# Patient Record
Sex: MALE | Race: WHITE
[De-identification: names, ages, dates, MRNs, and addresses within clinical notes are randomized per-mention and may not be internally consistent; named-entity substitution may affect disease eponyms.]

---

## 2020-03-19 ENCOUNTER — HOSPITAL ENCOUNTER (EMERGENCY)
Dept: HOSPITAL 63 - ER | Age: 20
Discharge: HOME | End: 2020-03-19
Payer: COMMERCIAL

## 2020-03-19 VITALS — HEIGHT: 69 IN | WEIGHT: 171.74 LBS | BODY MASS INDEX: 25.44 KG/M2

## 2020-03-19 VITALS — SYSTOLIC BLOOD PRESSURE: 126 MMHG | DIASTOLIC BLOOD PRESSURE: 85 MMHG

## 2020-03-19 DIAGNOSIS — R05: Primary | ICD-10-CM

## 2020-03-19 LAB
INFLUENZA A PATIENT: NEGATIVE
INFLUENZA B PATIENT: NEGATIVE

## 2020-03-19 PROCEDURE — 87880 STREP A ASSAY W/OPTIC: CPT

## 2020-03-19 PROCEDURE — 36415 COLL VENOUS BLD VENIPUNCTURE: CPT

## 2020-03-19 PROCEDURE — 87070 CULTURE OTHR SPECIMN AEROBIC: CPT

## 2020-03-19 PROCEDURE — 99283 EMERGENCY DEPT VISIT LOW MDM: CPT

## 2020-03-19 PROCEDURE — 87633 RESP VIRUS 12-25 TARGETS: CPT

## 2020-03-19 PROCEDURE — 87804 INFLUENZA ASSAY W/OPTIC: CPT

## 2020-03-19 NOTE — PHYS DOC
Past History


Past Medical History:  No Pertinent History


Past Surgical History:  No Surgical History


Smoking:  Non-smoker


Alcohol Use:  Occasionally


Drug Use:  None





Adult General


Chief Complaint


Chief Complaint:  COUGH





HPI


HPI





Patient is a 19-year-old male who presents with report that he is concerned that

he may have been exposed to the novel coronavirus. Patient states that his 

father was exposed to an employee who was just recently diagnosed with 

coronavirus. Patient states that he has been exercising in their home gym with 

his father and is concerned that he may have been exposed by his father. Patient

does admit to a mild cough over the last couple of days. He denies any fever, 

chest pain or shortness of breath.[]





Review of Systems


Review of Systems





Constitutional: Denies fever or chills []


Respiratory: Complains of cough without shortness of breath []


Cardiovascular: No additional information not addressed in HPI []


GI: Denies abdominal pain, nausea, vomiting, bloody stools or diarrhea []


Integument: Denies rash or skin lesions []


Neurologic: Denies headache, focal weakness or sensory changes []





Allergies


Allergies





Allergies








Coded Allergies Type Severity Reaction Last Updated Verified


 


  No Known Drug Allergies    9/7/18 No











Physical Exam


Physical Exam





Constitutional: Well developed, well nourished, no acute distress, non-toxic 

appearance. []


HENT: Normocephalic, atraumatic, bilateral external ears normal, oropharynx 

moist, no oral exudates, nose normal. []


Cardiovascular:Heart rate regular rhythm, no murmur []


Lungs & Thorax:  Bilateral breath sounds clear to auscultation []


Skin: Warm, dry, no erythema, no rash. []





Current Patient Data


Vital Signs





                                   Vital Signs








  Date Time  Temp Pulse Resp B/P (MAP) Pulse Ox O2 Delivery O2 Flow Rate FiO2


 


3/19/20 13:00 98.2 115 18 126/85 (99) 96 Room Air  











EKG


EKG


[]





Radiology/Procedures


Radiology/Procedures


[]





Course & Med Decision Making


Course & Med Decision Making


Pertinent Labs and Imaging studies reviewed. (See chart for details)





[]





Dragon Disclaimer


Dragon Disclaimer


This electronic medical record was generated, in whole or in part, using a voice

 recognition dictation system.





Departure


Departure:


Impression:  


   Primary Impression:  


   Cough


Disposition:  01 HOME, SELF-CARE


Condition:  STABLE


Referrals:  


PCP,EZIO (PCP)


Patient Instructions:  Cough, Adult





Additional Instructions:  


Follow-up with your primary care provider in the next week.











MARI MEZA Jr. DO          Mar 19, 2020 13:19

## 2020-05-02 ENCOUNTER — HOSPITAL ENCOUNTER (EMERGENCY)
Dept: HOSPITAL 63 - ER | Age: 20
Discharge: HOME | End: 2020-05-02
Payer: COMMERCIAL

## 2020-05-02 VITALS — SYSTOLIC BLOOD PRESSURE: 127 MMHG | DIASTOLIC BLOOD PRESSURE: 65 MMHG

## 2020-05-02 VITALS — BODY MASS INDEX: 25.76 KG/M2 | WEIGHT: 173.94 LBS | HEIGHT: 69 IN

## 2020-05-02 DIAGNOSIS — K02.9: Primary | ICD-10-CM

## 2020-05-02 PROCEDURE — 99283 EMERGENCY DEPT VISIT LOW MDM: CPT

## 2020-05-02 PROCEDURE — 96372 THER/PROPH/DIAG INJ SC/IM: CPT

## 2020-05-02 NOTE — PHYS DOC
Past History


Past Medical History:  Other


Additional Past Medical Histor:  ADD


Past Surgical History:  Other


Additional Past Surgical Histo:  CYST REMOVAL


Smoking:  Non-smoker


Alcohol Use:  Occasionally


Drug Use:  None





General Adult


EDM:


Chief Complaint:  DENTAL PROBLEM





HPI:


HPI:





Patient is a 19-year-old man who presented to ER today for evaluation of right 

lower tooth pain started 2 days ago.  Patient denies any fever, no headache.  

Patient had no problem open or close his mouth.





Review of Systems:


Review of Systems:


Constitutional:  Denies fever or chills 


Eyes:  Denies change in visual acuity 


HENT:  Denies nasal congestion or sore throat . Positive for dental pain


Respiratory:  Denies cough or shortness of breath 


Cardiovascular:  Denies chest pain or edema 


GI:  Denies abdominal pain, nausea, vomiting, bloody stools or diarrhea 


:  Denies dysuria 


Musculoskeletal:  Denies back pain or joint pain 


Integument:  Denies rash 


Neurologic:  Denies headache, focal weakness or sensory changes 


Endocrine:  Denies polyuria or polydipsia 


Lymphatic:  Denies swollen glands 


Psychiatric:  Denies depression or anxiety





Heart Score:


Risk Factors:


Risk Factors:  DM, Current or recent (<one month) smoker, HTN, HLP, family 

history of CAD, obesity.


Risk Scores:


Score 0 - 3:  2.5% MACE over next 6 weeks - Discharge Home


Score 4 - 6:  20.3% MACE over next 6 weeks - Admit for Clinical Observation


Score 7 - 10:  72.7% MACE over next 6 weeks - Early Invasive Strategies





Allergies:


Allergies:





Allergies








Coded Allergies Type Severity Reaction Last Updated Verified


 


  No Known Drug Allergies    9/7/18 No











Physical Exam:


PE:





Constitutional: Well developed, well nourished, no acute distress, non-toxic 

appearance. []


HENT: Normocephalic, atraumatic, bilateral external ears normal, oropharynx 

moist, no oral exudates, nose normal.  RIGHT LOWER THIRD MOLAR WITH CAVITY, 

TENDER TO TOUCH. , NO SWELLING. 


Eyes: PERRLA, EOMI, conjunctiva normal, no discharge. [] 


Neck: Normal range of motion, no tenderness, supple, no stridor. [] 


Cardiovascular:Heart rate regular rhythm, no murmur []


Lungs & Thorax:  Bilateral breath sounds clear to auscultation []


Abdomen: Bowel sounds normal, soft, no tenderness, no masses, no pulsatile 

masses. [] 


Skin: Warm, dry, no erythema, no rash. [] 


Back: No tenderness, no CVA tenderness. [] 


Extremities: No tenderness, no cyanosis, no clubbing, ROM intact, no edema. [] 


Neurologic: Alert and oriented X 3, normal motor function, normal sensory 

function, no focal deficits noted. []


Psychologic: Affect normal, judgement normal, mood normal. []





Current Patient Data:


Vital Signs:





                                   Vital Signs








  Date Time  Temp Pulse Resp B/P (MAP) Pulse Ox O2 Delivery O2 Flow Rate FiO2


 


5/2/20 09:55 97.7 83 20 127/65 (85) 98 Room Air  











EKG:


EKG:


[]





Radiology/Procedures:


Radiology/Procedures:


[]





Course & Med Decision Making:


Course & Med Decision Making


Pertinent Labs and Imaging studies reviewed. (See chart for details)





[]





Dragon Disclaimer:


Dragon Disclaimer:


This electronic medical record was generated, in whole or in part, using a voice

 recognition dictation system.





Departure


Departure:


Impression:  


   Primary Impression:  


   Pain due to dental caries


Disposition:  01 HOME, SELF-CARE


Condition:  STABLE


Referrals:  


JOSE BELL MD (PCP)


PLEASE FOLLOW UP WITH A DENTIST NEXT WEEK


Patient Instructions:  Dental Caries


Scripts


Tramadol Hcl (TRAMADOL HCL) 50 Mg Tablet


50 MG PO PRN Q6HRS PRN for PAIN, #15 TAB


   Prov: HEATHER FLORES DO         5/2/20 


Naproxen Sodium (ANAPROX DS) 550 Mg Tablet


1 TAB PO BID for DENTAL PAIN for 15 Days, #30 TAB 0 Refills


   Prov: HEATHER FLORES DO         5/2/20 


Amoxicillin (AMOXICILLIN) 500 Mg Capsule


1 CAP PO TID for DENTAL INFECTION, #30 CAP


   Prov: HEATHER FLORES DO         5/2/20











HEATHER FLORES DO                 May 2, 2020 10:49

## 2020-07-12 ENCOUNTER — HOSPITAL ENCOUNTER (EMERGENCY)
Dept: HOSPITAL 63 - ER | Age: 20
Discharge: HOME | End: 2020-07-12
Payer: COMMERCIAL

## 2020-07-12 VITALS — BODY MASS INDEX: 25.76 KG/M2 | HEIGHT: 69 IN | WEIGHT: 173.94 LBS

## 2020-07-12 VITALS — DIASTOLIC BLOOD PRESSURE: 71 MMHG | SYSTOLIC BLOOD PRESSURE: 126 MMHG

## 2020-07-12 DIAGNOSIS — F90.9: ICD-10-CM

## 2020-07-12 DIAGNOSIS — K02.9: Primary | ICD-10-CM

## 2020-07-12 PROCEDURE — 99283 EMERGENCY DEPT VISIT LOW MDM: CPT

## 2020-07-12 NOTE — PHYS DOC
Past History


Past Medical History:  Other


Additional Past Medical Histor:  ADD


Past Surgical History:  Other


Additional Past Surgical Histo:  CYST REMOVAL


Smoking:  Non-smoker


Alcohol Use:  Occasionally


Drug Use:  None





General Adult


EDM:


Chief Complaint:  DENTAL PROBLEM





HPI:


HPI:


"  .. I am still having dental pain back on the right... I been here before..   

But I have not followed up with a dentist yet... Last time he was here they put 

me on antibiotics did help but the pain came back and the swelling in back tooth

area.."





Patient is a 20 year old male who presents with above hx and complaints of teeth

31 and32.  Patient has no trismus.  Does have inflammation around gumline.  No 

pointing abscess.  Does have temporary filling and cavities in this area.  

Patient denies any history of immunosuppression.  Patient has pending dental 

appointment.  Has a pending insurance foods no employment.. No recent travel.  

No history of immunosuppression.





Review of Systems:


Review of Systems:


Constitutional:  Denies fever or chills 


Eyes:  Denies change in visual acuity 


HENT:  Denies nasal congestion or sore throat.  Complains of dental pain right 

lower molar 31 and 32


Respiratory:  Denies cough or shortness of breath 


Cardiovascular:  Denies chest pain or edema 


GI:  Denies abdominal pain, nausea, vomiting, bloody stools or diarrhea 


: Denies dysuria 


Musculoskeletal:  Denies back pain or joint pain 


Integument:  Denies rash 


Neurologic:  Denies headache, focal weakness or sensory changes 


Endocrine:  Denies polyuria or polydipsia 


Lymphatic:  Denies swollen glands 


Psychiatric:  Denies depression or anxiety





Heart Score:


Risk Factors:


Risk Factors:  DM, Current or recent (<one month) smoker, HTN, HLP, family 

history of CAD, obesity.


Risk Scores:


Score 0 - 3:  2.5% MACE over next 6 weeks - Discharge Home


Score 4 - 6:  20.3% MACE over next 6 weeks - Admit for Clinical Observation


Score 7 - 10:  72.7% MACE over next 6 weeks - Early Invasive Strategies





Family History:


Family History:


Noncontributory





Current Medications:


Current Meds:


See Nursing for home meds





Allergies:


Allergies:





Allergies








Coded Allergies Type Severity Reaction Last Updated Verified


 


  No Known Drug Allergies    9/7/18 No











Physical Exam:


PE:





Constitutional: Well developed, well nourished, no acute distress, non-toxic 

appearance. []


HENT: Normocephalic, atraumatic, bilateral external ears normal, oropharynx 

moist, no oral exudates, nose normal. []Pain at Molars 31, 32


Eyes: PERRLA, EOMI, conjunctiva normal, no discharge. [] 


Neck: Normal range of motion, no tenderness, supple, no stridor. [] 


Cardiovascular:Heart rate regular rhythm, no murmur []


Lungs & Thorax:  Bilateral breath sounds at apex on auscultation []


Abdomen: Bowel sounds normal, soft, no tenderness, no masses, no pulsatile 

masses. [] 


Skin: Warm, dry, no erythema, no rash. [] 


Back: No tenderness, no CVA tenderness. [] 


Extremities: No tenderness, no cyanosis, no clubbing, ROM intact, no edema. [] 


Neurologic: Alert and oriented X 3, normal motor function, normal sensory 

function, no focal deficits noted. []


Psychologic: Affect normal, judgement normal, mood normal. []





EKG:


EKG:


[]





Radiology/Procedures:


Radiology/Procedures:


[]





Course & Med Decision Making:


Course & Med Decision Making


Pertinent Labs and Imaging studies reviewed. (See chart for details)





Patient take Tylenol and ibuprofen for pain.  Rinse mouth with Listerine or 

hydrogen peroxide 4 times a day.  Keep follow-up with dental clinic.  Take 

amoxicillin 500 mg 3 times a day.





Impression-


1.  Dental pain


2.  Dental caries





[]





Dragon Disclaimer:


Dragon Disclaimer:


This electronic medical record was generated, in whole or in part, using a voice

 recognition dictation system.





Departure


Departure:


Disposition:  01 HOME/RESIDENCE PRIOR TO ADM


Condition:  STABLE


Referrals:  


JOSE BELL MD (PCP)


Scripts


Amoxicillin (AMOXICILLIN) 500 Mg Capsule


500 MG PO TID for dental for 10 Days, #30 CAP


   Prov: FAHAD RIVAS MD         7/12/20





Justification of Admission:


Justification of Admission:


Justification of Admission Dx:  N/A





Dragon Disclaimer


This chart was dictated in whole or in part using Voice Recognition software in 

a busy, high-work load, and often noisy Emergency Department environment.  It 

may contain unintended and wholly unrecognized errors or omissions.











FAHAD RIVAS MD           Jul 12, 2020 05:44